# Patient Record
Sex: MALE | URBAN - METROPOLITAN AREA
[De-identification: names, ages, dates, MRNs, and addresses within clinical notes are randomized per-mention and may not be internally consistent; named-entity substitution may affect disease eponyms.]

---

## 2017-10-10 ENCOUNTER — OFFICE VISIT (OUTPATIENT)
Dept: FAMILY MEDICINE | Facility: CLINIC | Age: 22
End: 2017-10-10

## 2017-10-10 VITALS
SYSTOLIC BLOOD PRESSURE: 132 MMHG | BODY MASS INDEX: 18.96 KG/M2 | WEIGHT: 140 LBS | HEART RATE: 90 BPM | DIASTOLIC BLOOD PRESSURE: 78 MMHG | HEIGHT: 72 IN

## 2017-10-10 DIAGNOSIS — Z02.89 HEALTH EXAMINATION OF DEFINED SUBPOPULATION: Primary | ICD-10-CM

## 2017-10-10 NOTE — LETTER
Date:October 25, 2017      Patient was self referred, no letter generated. Do not send.        [facility] Health Information

## 2017-10-10 NOTE — MR AVS SNAPSHOT
After Visit Summary   10/10/2017    Melvin Ha    MRN: [id number]           Patient Information     Date Of Birth          1995        Visit Information        Provider Department      10/10/2017 11:30 AM Janine Adams MD [facility]        Today's Diagnoses     Health examination of defined subpopulation    -  1       Follow-ups after your visit        Who to contact     Please call your clinic at [phone] to:    Ask questions about your health    Make or cancel appointments    Discuss your medicines    Learn about your test results    Speak to your doctor   If you have compliments or concerns about an experience at your clinic, or if you wish to file a complaint, please contact [facility] Patient Relations at [phone] or email us at [email or web address]         Additional Information About Your Visit        MyChart Information     E[employer] Solution[employer] is an electronic gateway that provides easy, online access to your medical records. With [employer], you can request a clinic appointment, read your test results, renew a prescription or communicate with your care team.     To sign up for [employer] visit the website at [email or web address]   You will be asked to enter the access code listed below, as well as some personal information. Please follow the directions to create your username and password.     Your access code is: [email or web address]  Expires: 2018 12:29 PM     Your access code will  in 90 days. If you need help or a new code, please contact your [facility] or call [phone] for assistance.        Care EveryWhere ID     This is your Care EveryWhere ID. This could be used by other organizations to access your [place] medical records  [id number]        Your Vitals Were     Pulse Height BMI (Body Mass Index)             90 6' (1.829 m) 18.99 kg/m2          Blood Pressure  from Last 3 Encounters:   10/10/17 132/78    Weight from Last 3 Encounters:   10/10/17 140 lb (63.5 kg)              Today, you had the following     No orders found for display       Primary Care Provider    None Specified       No primary provider on file.        Equal Access to Services     RAVEN [place] : Hadii aad ku hadmyriamo Soleonaali, wahalda luqadaha, gillianta kaalmada indigoeliolivia, jeffy chinchillaalexieva brown . So [facility] [phone].    ATENCIÓN: Si habla español, tiene a adams disposición servicios gratuitos de asistencia lingüística. Llame al [phone].    We comply with applicable federal civil rights laws and [place] laws. We do not discriminate on the basis of race, color, national origin, age, disability, sex, sexual orientation, or gender identity.            Thank you!     Thank you for choosing [facility]  for your care. Our goal is always to provide you with excellent care. Hearing back from our patients is one way we can continue to improve our services. Please take a few minutes to complete the written survey that you may receive in the mail after your visit with us. Thank you!             Your Updated Medication List - Protect others around you: Learn how to safely use, store and throw away your medicines at www.disposemymeds.org.      Notice  As of 10/10/2017 11:59 PM    You have not been prescribed any medications.

## 2017-10-10 NOTE — PROGRESS NOTES
Melvin Ha  Vitals: /78  Pulse 90  Ht 1.829 m (6')  Wt 63.5 kg (140 lb)  BMI 18.99 kg/m2  BMI= Body mass index is 18.99 kg/(m^2).  Sport(s): Basketball (women's  team)    Vision: Right Eye: 20/20 Left Eye: 20/20 Both Eyes: 20/20  Correction: none  Pupils: equal    Mouth Guard: No  Sickle Cell Trait: Discussed and Patient refused Sickle Cell Trait testing, signed waiver  Concussions: Concussion fact sheet reviewed. Student Athlete gave written and verbal agreement to report any suspected concussions.    General/Medical  Eyes/Vision: Normal  Ears/Hearing: Normal  Nose: Normal  Mouth/Dental: Normal  Throat: Normal  Thyroid: Normal  Lymph Nodes: Normal  Lungs: Normal  Abdomen: Normal  Skin: Normal    Musculoskeletal/Orthopaedic  Neck/Cervical: Normal  Thoracic/Lumbar: Normal  Shoulder/Upper Arm: Normal  Elbow/Forearm: Normal  Wrist/Hand/Fingers: Normal  Hip/Thigh: Normal  Knee/Patella: Normal  Lower Leg/Ankles: Normal  Foot/Toes: Normal    Cardiovascular Screening  Heart Murmur:No Grade: NA  Symmetric Femoral pulses: Yes    Stigmata of Marfan's Syndrome - if appropriate:  Not applicable    COMMENTS, RECOMMENDATIONS and PARTICIPATION STATUS  Cleared  Allergic rhinitis:  Well controlled    Keivn Oreilly MD, Sports Medicine Fellow was present for the entire appointment and examined the patient.     Janine Adams MD, CAQ, FACSM, CCD  [facility]  Sports Medicine and Bone Health  Team Physician;  Athletics

## 2017-10-10 NOTE — LETTER
10/10/2017      RE: Melvin Ha  [address] [facility] [address]       Melvin Ha  Vitals: /78  Pulse 90  Ht 1.829 m (6')  Wt 63.5 kg (140 lb)  BMI 18.99 kg/m2  BMI= Body mass index is 18.99 kg/(m^2).  Sport(s): Basketball (women's  team)    Vision: Right Eye: 20/20 Left Eye: 20/20 Both Eyes: 20/20  Correction: none  Pupils: equal    Mouth Guard: No  Sickle Cell Trait: Discussed and Patient refused Sickle Cell Trait testing, signed waiver  Concussions: Concussion fact sheet reviewed. Student Athlete gave written and verbal agreement to report any suspected concussions.    General/Medical  Eyes/Vision: Normal  Ears/Hearing: Normal  Nose: Normal  Mouth/Dental: Normal  Throat: Normal  Thyroid: Normal  Lymph Nodes: Normal  Lungs: Normal  Abdomen: Normal  Skin: Normal    Musculoskeletal/Orthopaedic  Neck/Cervical: Normal  Thoracic/Lumbar: Normal  Shoulder/Upper Arm: Normal  Elbow/Forearm: Normal  Wrist/Hand/Fingers: Normal  Hip/Thigh: Normal  Knee/Patella: Normal  Lower Leg/Ankles: Normal  Foot/Toes: Normal    Cardiovascular Screening  Heart Murmur:No Grade: NA  Symmetric Femoral pulses: Yes    Stigmata of Marfan's Syndrome - if appropriate:  Not applicable    COMMENTS, RECOMMENDATIONS and PARTICIPATION STATUS  Cleared  Allergic rhinitis:  Well controlled    Kevin Oreilly MD, Sports Medicine Fellow was present for the entire appointment and examined the patient.     Janine Adams MD, CAQ, FACSM, CCD  [facility]  Sports Medicine and Bone Health  Team Physician;  Athletics        Janine Adams MD

## 2019-04-01 ENCOUNTER — OFFICE VISIT (OUTPATIENT)
Dept: ORTHOPEDICS | Facility: CLINIC | Age: 24
End: 2019-04-01
Payer: COMMERCIAL

## 2019-04-01 VITALS — BODY MASS INDEX: 18.99 KG/M2 | HEIGHT: 72 IN | RESPIRATION RATE: 16 BRPM

## 2019-04-01 DIAGNOSIS — M89.8X1 PERISCAPULAR PAIN: Primary | ICD-10-CM

## 2019-04-01 RX ORDER — FLUTICASONE PROPIONATE 50 MCG
2 SPRAY, SUSPENSION (ML) NASAL
COMMUNITY
Start: 2016-09-07

## 2019-04-01 NOTE — PROGRESS NOTES
[facility]  Orthopedics  Oleksandr Corona MD  2019     Name: Melvin Ha  MRN: [id number]  Age: 24 year old  : 1995  Referring provider: Referred Self     Chief Complaint:   Mid and Upper Back Pain.     History of Present Illness:   Melvin Ha is a 24 year old male [employer] [facility] student who presents today for evaluation of mid to upper back pain beginning 1-2 days ago without precipitating injury or trauma. He does mention he works for [employer] and does do some heavy lifting for work which may be related to his symptoms. Pain is localized centrally and is achy in nature. It is non-radiating but he mentions it is worse if he is lying directly onto his back or if he extends his neck. He denies any clicking or popping sensations. He has not tried anything for treatment or pain management at home.      Review of Systems:   A 10-point review of systems was obtained and is negative except for as noted in the HPI.     Medications:      fluticasone (FLONASE) 50 MCG/ACT nasal spray, 2 sprays, Disp: , Rfl:     Allergies:  Seasonal allergies.     Past Medical History:  The patient denies any pertinent past medical history.     Past Surgical History:  The patient does not have any pertinent past surgical history.     Social History:  Single. Non-smoker. Endorses social alcohol use.     Family History:  Paternal grandmother: Positive for diabetes.     Physical Examination:  Resp. rate 16, height 1.829 m (6').  General: alert, pleasant, no distress. sitting comfortably in chair  Back/Spine: Tenderness in the periscapular region on the right. Bilateral prominence at the inferomedial scapular borders. Asymmetric scapular movement. No tenderness to palpation of spinous processes. Full ROM with flexion, extension, twisting, and side bending without pain.   Neuro: Symmetric strength in the shoulders. Sensation intact to bilateral upper extremities.     Assessment:   24 year old male with  periscapular pain and scapular dysfunction.     Plan:   - Recommended symptomatic management with ice, heat, over-the-counter pain medication, rest, and activity modification.   - Formal physical therapy referral and home exercise program were provided.   - Follow up as needed for new, worsening, or persistent symptoms.     Oleksandr Corona MD    Scribe Disclosure:   Denys PEREZ, am serving as a scribe to document services personally performed by Oleksandr Corona MD at this visit, based upon the provider's statements to me. All documentation has been reviewed by the aforementioned provider prior to being entered into the official medical record.

## 2019-04-01 NOTE — PROGRESS NOTES
SPORTS & ORTHOPEDIC WALK-IN VISIT 4/1/2019    Primary Care Physician:      Past few days has had pain in upper back, especially if laying on back or extending neck. Pain in right rhomboids area. No changes to work out recently but works for [employer] and may have injured it lifting something.     Reason for visit:     What part of your body is injured / painful?  right upper back     What caused the injury /pain? Unsure    How long ago did your injury occur or pain begin? several days ago    What are your most bothersome symptoms? Pain    How would you characterize your symptom?  aching    What makes your symptoms better? Nothing - hasn't tried anything    What makes your symptoms worse? Other: laying on back, extending neck    Have you been previously seen for this problem? No    Medical History:    Any recent changes to your medical history? No    Any new medication prescribed since last visit? No    Have you had surgery on this body part before? No    Social History:    Occupation: student    Handedness: Ambidextrous    Exercise: Most days/week    Review of Systems:    Do you have fever, chills, weight loss? No    Do you have any vision problems? No    Do you have any chest pain or edema? No    Do you have any shortness of breath or wheezing?  No    Do you have stomach problems? No    Do you have any numbness or focal weakness? No    Do you have diabetes? No    Do you have problems with bleeding or clotting? No    Do you have an rashes or other skin lesions? No

## 2019-04-01 NOTE — LETTER
Verification of Appointment  2019     Seen today: yes    Patient:  Melvin Ha  :   1995  MRN:     [id number]  Physician: NORA MATTHEWS    Melvin Ha was evaluated at the [facility] on 19.        Electronically signed by Nora Matthews MD